# Patient Record
Sex: FEMALE | Race: WHITE | Employment: PART TIME | URBAN - METROPOLITAN AREA
[De-identification: names, ages, dates, MRNs, and addresses within clinical notes are randomized per-mention and may not be internally consistent; named-entity substitution may affect disease eponyms.]

---

## 2017-11-17 ENCOUNTER — OFFICE VISIT (OUTPATIENT)
Dept: ORTHOPEDIC SURGERY | Age: 52
End: 2017-11-17

## 2017-11-17 VITALS
DIASTOLIC BLOOD PRESSURE: 81 MMHG | SYSTOLIC BLOOD PRESSURE: 126 MMHG | HEART RATE: 88 BPM | HEIGHT: 64 IN | WEIGHT: 160 LBS | BODY MASS INDEX: 27.31 KG/M2

## 2017-11-17 DIAGNOSIS — M25.561 RIGHT KNEE PAIN, UNSPECIFIED CHRONICITY: Primary | ICD-10-CM

## 2017-11-17 DIAGNOSIS — Z98.890 STATUS POST KNEE SURGERY: ICD-10-CM

## 2017-11-17 DIAGNOSIS — M17.5 OTHER SECONDARY OSTEOARTHRITIS OF RIGHT KNEE: ICD-10-CM

## 2017-11-17 PROCEDURE — 4004F PT TOBACCO SCREEN RCVD TLK: CPT | Performed by: ORTHOPAEDIC SURGERY

## 2017-11-17 PROCEDURE — 99203 OFFICE O/P NEW LOW 30 MIN: CPT | Performed by: ORTHOPAEDIC SURGERY

## 2017-11-17 PROCEDURE — L1810 KO ELASTIC WITH JOINTS: HCPCS | Performed by: ORTHOPAEDIC SURGERY

## 2017-11-17 PROCEDURE — 3014F SCREEN MAMMO DOC REV: CPT | Performed by: ORTHOPAEDIC SURGERY

## 2017-11-17 PROCEDURE — 73564 X-RAY EXAM KNEE 4 OR MORE: CPT | Performed by: ORTHOPAEDIC SURGERY

## 2017-11-17 PROCEDURE — 3017F COLORECTAL CA SCREEN DOC REV: CPT | Performed by: ORTHOPAEDIC SURGERY

## 2017-11-17 PROCEDURE — G8419 CALC BMI OUT NRM PARAM NOF/U: HCPCS | Performed by: ORTHOPAEDIC SURGERY

## 2017-11-17 PROCEDURE — G8484 FLU IMMUNIZE NO ADMIN: HCPCS | Performed by: ORTHOPAEDIC SURGERY

## 2017-11-17 PROCEDURE — G8427 DOCREV CUR MEDS BY ELIG CLIN: HCPCS | Performed by: ORTHOPAEDIC SURGERY

## 2017-11-17 NOTE — PROGRESS NOTES
History     Social History    Marital status:      Spouse name: N/A    Number of children: N/A    Years of education: N/A     Occupational History    Not on file. Social History Main Topics    Smoking status: Current Every Day Smoker    Smokeless tobacco: Current User    Alcohol use Not on file    Drug use: Unknown    Sexual activity: Not on file     Other Topics Concern    Not on file     Social History Narrative    No narrative on file     No family history on file. Current Medications:    Current Outpatient Prescriptions   Medication Sig Dispense Refill    mesalamine (LIALDA) 1.2 G EC tablet Take 2,400 mg by mouth 2 times daily.  valACYclovir (VALTREX) 1 G tablet Take 1,000 mg by mouth daily. No current facility-administered medications for this visit. Allergies: Allergies   Allergen Reactions    Banana Itching    Codeine Nausea Only    Iv Dye [Iodides] Itching       Physical Exam:  Vitals:    11/17/17 0914   BP: 126/81   Pulse: 88     General: Ye Owen is a 46y.o. year old female who is sitting comfortably in our office in no acute distress. Alert and oriented. Objective:   Physical Exam  Vital Signs:  /81   Pulse 88   Ht 5' 4\" (1.626 m)   Wt 160 lb (72.6 kg)   BMI 27.46 kg/m²     Constitution:  Generally, Ye Owen is [x] alert, [x] appears stated age, and [x] in no distress.   Her general body habitus is [] Cachectic  [] Thin  [x] Normal  [] Obese  [] Morbidly Obese    Head: [x] Normocephalic  Eyes: [x] Extra-occular muscles intact  Left Ear: [x] External Ear normal   Right Ear: [x] External Ear normal   Nose: [x] Normal  Mouth: [x] Oral mucosa moist  [x] No perioral lesions    Pulmonary: [x] Respirations unlabored and regular  Skin: [x] Warm [x] Well perfused     Psychiatric:   [x] Good judgement and insight  [x] Oriented to [x] person, [x] place, and [x] time  [x] Mood appropriate for circumstances    Gait:   Gait is [] Normal

## 2017-11-30 ENCOUNTER — OFFICE VISIT (OUTPATIENT)
Dept: ORTHOPEDIC SURGERY | Age: 52
End: 2017-11-30

## 2017-11-30 VITALS
HEART RATE: 70 BPM | HEIGHT: 64 IN | WEIGHT: 160.05 LBS | BODY MASS INDEX: 27.33 KG/M2 | SYSTOLIC BLOOD PRESSURE: 124 MMHG | DIASTOLIC BLOOD PRESSURE: 78 MMHG

## 2017-11-30 DIAGNOSIS — S83.241D TEAR OF MEDIAL MENISCUS OF RIGHT KNEE, CURRENT, UNSPECIFIED TEAR TYPE, SUBSEQUENT ENCOUNTER: Primary | ICD-10-CM

## 2017-11-30 PROCEDURE — 3017F COLORECTAL CA SCREEN DOC REV: CPT | Performed by: ORTHOPAEDIC SURGERY

## 2017-11-30 PROCEDURE — 3014F SCREEN MAMMO DOC REV: CPT | Performed by: ORTHOPAEDIC SURGERY

## 2017-11-30 PROCEDURE — G8419 CALC BMI OUT NRM PARAM NOF/U: HCPCS | Performed by: ORTHOPAEDIC SURGERY

## 2017-11-30 PROCEDURE — 4004F PT TOBACCO SCREEN RCVD TLK: CPT | Performed by: ORTHOPAEDIC SURGERY

## 2017-11-30 PROCEDURE — G8484 FLU IMMUNIZE NO ADMIN: HCPCS | Performed by: ORTHOPAEDIC SURGERY

## 2017-11-30 PROCEDURE — 99213 OFFICE O/P EST LOW 20 MIN: CPT | Performed by: ORTHOPAEDIC SURGERY

## 2017-11-30 PROCEDURE — G8427 DOCREV CUR MEDS BY ELIG CLIN: HCPCS | Performed by: ORTHOPAEDIC SURGERY

## 2017-11-30 NOTE — PROGRESS NOTES
(VALTREX) 1 G tablet Take 1,000 mg by mouth daily. No current facility-administered medications on file prior to visit. Allergies   Allergen Reactions    Banana Itching    Codeine Nausea Only    Iv Dye [Iodides] Itching       Review of Systems:  A 14 point review of systems available in the scanned medical record as documented by the patient. The review is negative with the exception of those things mentioned in the HPI and Past Medical History. Vital Signs:  Vitals:    11/30/17 1124   BP: 124/78   Pulse: 70         General Exam:     Neuro: Alert & oriented x 3,  normal,  no focal deficits noted. Normal affect. Eyes: sclera clear  Ears: Normal external ear  Lymph: no lymphedema  Pulm: Respirations unlabored and regular  Pulse: Regular rate and rhythm   Skin: Warm, well perfused    Musculoskeletal:    Right Knee Exam:   Overall alignment is neutral     Gait/Alignment: No use of assistive devices.      Inspection/Skin: Skin is intact without erythema or ecchymosis. No significant swelling. No deformity.      Effusion; none.      Palpation: The patient is nontender over the medial lateral joint lines but does have a pierced to be radiating pain along the anterior portion of her of her knee joint. Patient has minimal crepitus with passive range of motion of the knee within the patellofemoral joint. Calf compartments are soft and non-tender. No signs of DVT.      Range of Motion: From 0° to 120° degrees of flexion without pain.     Strength: 5/5 strength of the quadriceps and hamstrings.      Ligamentous Stability: Stable to valgus and varus stress testing at 0° and 30°. Lachman's has a solid endpoint. No posterior sag. Negative posterior drawer.     Neurologic and vascular: Normal motor and sensory function in the deep peroneal, superficial peroneal, tibial, saphenous, and sural nerve distributions.  Foot warm and well perfused            Radiology:     3-view X-rays of the right knee including AP, lateral and Merchant views were obtained and reviewed in office today: X-rays demonstrate plain radiographs of the patient's right knee shows arthritic changes within all 3 compartments of the knee joint. There is no signs of loose body or fracture. Impression: Right knee likely meniscus tear    Visit Diagnoses:  1. Tear of medial meniscus of right knee, current, unspecified tear type, subsequent encounter         Office Procedures:  No orders of the defined types were placed in this encounter. Treatment Plan: Diagnoses and treatments were reviewed with the patient today. Patient education material was provided. Questions were entertained and answered to the patient's satisfaction. Given that the patient has exhausted all forms of conservative management and her history as well as physical exam we also believe that she has a meniscus injury, we believe that he would be in the patient's best interest to obtain an MRI of her right knee in order to ascertain whether she actually truly has a meniscal injury and if so this would be used to guide us in surgical management of the patient's injury. Greater than 50% of the appointment was used to  the patient on her diagnosis as well as various treatment options based off of what her true diagnosis is confirmed to be on MRI. All questions were answered in great detail and the patient stated a good understanding of everything that was explained. Once the patient has obtained her MRI we will have him follow-up in our clinic so that we can discuss the results and plan for treatment. Sincerely,    Juan Yanez MD  Clinical Fellow in [address] [place] [address]    I attest that my visit today with Christine Hills was supervised by Dr Shara Kunz    This dictation was performed with a verbal recognition program [facility]) and it was checked for errors.   It is possible that there are still dictated errors

## 2017-12-22 ENCOUNTER — TELEPHONE (OUTPATIENT)
Dept: ORTHOPEDIC SURGERY | Age: 52
End: 2017-12-22

## 2017-12-22 DIAGNOSIS — S83.241A TEAR OF MEDIAL MENISCUS OF RIGHT KNEE, UNSPECIFIED TEAR TYPE, UNSPECIFIED WHETHER OLD OR CURRENT TEAR, INITIAL ENCOUNTER: Primary | ICD-10-CM

## 2018-01-23 ENCOUNTER — HOSPITAL ENCOUNTER (OUTPATIENT)
Dept: PHYSICAL THERAPY | Age: 53
Discharge: HOME | End: 2018-01-31
Admitting: ORTHOPAEDIC SURGERY

## 2018-01-23 NOTE — PLAN OF CARE
[facility]  Orthopaedics and Sports Rehabilitation, [facility]  [address], [place], [address]  Phone: [phone]   Fax:     [phone]                                                       Physical Therapy Certification    Dear Referring Practitioner: Dr. Arianna Hsu,    We had the pleasure of evaluating the following patient for physical therapy services at [address] [place] [address]. A summary of our findings can be found in the initial assessment below. This includes our plan of care. If you have any questions or concerns regarding these findings, please do not hesitate to contact me at the office phone number checked above. Thank you for the referral.       Physician Signature:_______________________________Date:__________________  By signing above (or electronic signature), therapists plan is approved by physician      Patient: Bridget Velasco   : 1965   MRN: [id number]  Referring Physician: Referring Practitioner: Dr. Arianna Hsu      Evaluation Date: 2018      Medical Diagnosis Information:  Diagnosis: Q02.437R (ICD-10-CM) - Tear of medial meniscus of right knee, unspecified tear type, unspecified whether old or current tear, initial encounter   Treatment Diagnosis: Right Knee Pain                                         Insurance information: PT Insurance Information: [employer]     Precautions/ Contra-indications:   Latex Allergy:  [x]NO      []YES=  Preferred Language for Healthcare:   [x]English       []other:    SUBJECTIVE: Patient stated complaint:Patient states 3-4 months ago had an insidious onset of pain. Patient states  patient states right knee reconstruction. Patient states until recently knee has been doing well. 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   []Signs/symptoms consistent with functional hip weakness/NMR control      []Signs/symptoms consistent with tendinitis/tendinosis    []signs/symptoms consistent with pathology which may benefit from Dry needling      []other:      Prognosis/Rehab Potential:      []Excellent   [x]Good    []Fair   []Poor    Tolerance of evaluation/treatment:    []Excellent   [x]Good    []Fair   []Poor    Physical Therapy Evaluation Complexity Justification  [x] A history of present problem with:  [] no personal factors and/or comorbidities that impact the plan of care;  [x]1-2 personal factors and/or comorbidities that impact the plan of care  []3 personal factors and/or comorbidities that impact the plan of care  [x] An examination of body systems using standardized tests and measures addressing any of the following: body structures and functions (impairments), activity limitations, and/or participation restrictions;:  [x] a total of 1-2 or more elements   [] a total of 3 or more elements   [] a total of 4 or more elements   [x] A clinical presentation with:  [x] stable and/or uncomplicated characteristics   [] evolving clinical presentation with changing characteristics  [] unstable and unpredictable characteristics;   [x] Clinical decision making of [x] low, [] moderate, [] high complexity using standardized patient assessment instrument and/or measurable assessment of functional outcome. [x] EVAL (LOW) 41250 (typically 20 minutes face-to-face)  [] EVAL (MOD) 88362 (typically 30 minutes face-to-face)  [] EVAL (HIGH) 49890 (typically 45 minutes face-to-face)  [] RE-EVAL     PLAN  Frequency/Duration:  2 days per week for 4 Weeks:  Interventions:  1. Therapeutic exercise including: strength training, ROM/flexibility, NMR and proprioception for the proximal hip, core and Lower extremity  2. Manual therapy as indicated including Dry Needling/IASTM, STM, PROM, Gr I-IV mobilizations, spinal mobilization/manipulation.    3. Modalities as needed including: thermal agents, E-stim, US, iontophoresis as indicated. 4. Patient education on joint protection, activity modification, progression of HEP. HEP instruction: Can be found scanned in media file. (see scanned forms)    GOALS:  Patient stated goal: Work out and perform ADLs    The patient will demonstrate at least 20% but less than 40% impairment, limitation or restriction in:   - walking and moving around (mobility)       Therapist goals for Patient:   Short Term Goals: To be achieved in: 2 weeks  1. Independent in HEP and progression per patient tolerance, in order to prevent re-injury. 2. Patient will have a decrease in pain to facilitate improvement in movement, function, and ADLs as indicated by Functional Deficits. Long Term Goals: To be achieved in: 4 weeks  1. Disability index score of 20% or less for the LEFS to assist with reaching prior level of function. 2. Patient will demonstrate increased AROM to 5 deg ext to allow for proper joint functioning as indicated by patients Functional Deficits. 3. Patient will demonstrate an increase in Strength to 5/5 right knee and hip to allow for proper functional mobility as indicated by patients Functional Deficits. 4. Patient will return to go up and down a flight of stairs without increased symptoms or restriction. 5. Patient will be able to arise from a chair without increased symptoms or restriction.       Electronically signed by:  Adry Anton, Toribio Anton 1

## 2018-01-23 NOTE — FLOWSHEET NOTE
Flex x15    SLR ABD x15    SLR Ext     Clamshells     Prone Donkey Kick     Bridge     Ankle Theraband     BAPS     HR/TR     Squats     Lateral Walk     Single Leg Balance     EOT Hip Ext/ Donkey Kick                  Manual      Hip Mobs with Belt     Knee Mobs x4'    Patellar Mobs x4'    Ankle Mobs/PROM Grade-         Therapeutic Exercise and NMR EXR  [] (51993) Provided verbal/tactile cueing for activities related to strengthening, flexibility, endurance, ROM for improvements in LE, proximal hip, and core control with self care, mobility, lifting, ambulation.  [] (91154) Provided verbal/tactile cueing for activities related to improving balance, coordination, kinesthetic sense, posture, motor skill, proprioception  to assist with LE, proximal hip, and core control in self care, mobility, lifting, ambulation and eccentric single leg control.      NMR and Therapeutic Activities:    [] (15437 or 30857) Provided verbal/tactile cueing for activities related to improving balance, coordination, kinesthetic sense, posture, motor skill, proprioception and motor activation to allow for proper function of core, proximal hip and LE with self care and ADLs  [] (45297) Gait Re-education- Provided training and instruction to the patient for proper LE, core and proximal hip recruitment and positioning and eccentric body weight control with ambulation re-education including up and down stairs     Home Exercise Program:    [x] (05938) Reviewed/Progressed HEP activities related to strengthening, flexibility, endurance, ROM of core, proximal hip and LE for functional self-care, mobility, lifting and ambulation/stair navigation   [] (34103)Reviewed/Progressed HEP activities related to improving balance, coordination, kinesthetic sense, posture, motor skill, proprioception of core, proximal hip and LE for self care, mobility, lifting, and ambulation/stair navigation      Manual Treatments:  PROM / STM / Oscillations-Mobs:  G-I, II,

## 2018-01-29 ENCOUNTER — HOSPITAL ENCOUNTER (OUTPATIENT)
Dept: PHYSICAL THERAPY | Age: 53
Discharge: HOME | End: 2018-01-30
Admitting: ORTHOPAEDIC SURGERY

## 2018-01-29 NOTE — FLOWSHEET NOTE
Tolerance:  [x] Patient tolerated treatment well [] Patient limited by fatique  [] Patient limited by pain  [] Patient limited by other medical complications  [] Other:     Prognosis: [x] Good [] Fair  [] Poor    Patient Requires Follow-up: [x] Yes  [] No    PLAN: See eval  [x] Continue per plan of care [] Alter current plan (see comments)  [] Plan of care initiated [] Hold pending MD visit [] Discharge    *If patient does not return for further follow ups after this date. Please consider this as the patients discharge from physical therapy.      Electronically signed by: Caroline Angel, [id number] [facility]

## 2018-01-31 ENCOUNTER — HOSPITAL ENCOUNTER (OUTPATIENT)
Dept: PHYSICAL THERAPY | Age: 53
Discharge: HOME | End: 2018-02-01
Admitting: ORTHOPAEDIC SURGERY

## 2018-02-01 ENCOUNTER — HOSPITAL ENCOUNTER (OUTPATIENT)
Dept: PHYSICAL THERAPY | Age: 53
Discharge: HOME | End: 2018-02-28
Attending: ORTHOPAEDIC SURGERY | Admitting: ORTHOPAEDIC SURGERY

## 2018-02-07 ENCOUNTER — HOSPITAL ENCOUNTER (OUTPATIENT)
Dept: PHYSICAL THERAPY | Age: 53
Discharge: HOME | End: 2018-02-08
Admitting: ORTHOPAEDIC SURGERY

## 2018-02-07 NOTE — FLOWSHEET NOTE
improving soft tissue extensibility and allowing for proper ROM for normal function with self care, mobility, lifting and ambulation. Modalities:   pt. Declined     Charges:  Timed Code Treatment Minutes: 40   Total Treatment Minutes: 40   Pt. Was in a hour time slot  [] EVAL (LOW) 04534 (typically 20 minutes face-to-face)  [] EVAL (MOD) 07466 (typically 30 minutes face-to-face)  [] EVAL (HIGH) 14507 (typically 45 minutes face-to-face)  [] RE-EVAL      [x] LR(08139) x  2   [] IONTO  [x] NMR (43312) x  1   [] VASO  [] Manual (54821) x       [] Other:  [] TA x       [] Mech Traction (02243)  [] ES(attended) (39436)      [] ES (un) (15344):     GOALS:   Short Term Goals: To be achieved in: 2 weeks  1. Independent in HEP and progression per patient tolerance, in order to prevent re-injury. 2. Patient will have a decrease in pain to facilitate improvement in movement, function, and ADLs as indicated by Functional Deficits. Long Term Goals: To be achieved in: 4 weeks  1. Disability index score of 20% or less for the LEFS to assist with reaching prior level of function. 2. Patient will demonstrate increased AROM to 5 deg ext to allow for proper joint functioning as indicated by patients Functional Deficits. 3. Patient will demonstrate an increase in Strength to 5/5 right knee and hip to allow for proper functional mobility as indicated by patients Functional Deficits. 4. Patient will return to go up and down a flight of stairs without increased symptoms or restriction. 5. Patient will be able to arise from a chair without increased symptoms or restriction. Progression Towards Functional goals:  [] Patient is progressing as expected towards functional goals listed. [] Progression is slowed due to complexities listed. [] Progression has been slowed due to co-morbidities.   [x] Plan just implemented, too soon to assess goals progression  [] Other:     ASSESSMENT: knee ext ROM is improving with PT.    Treatment/Activity Tolerance:  [x] Patient tolerated treatment well [] Patient limited by fatique  [] Patient limited by pain  [] Patient limited by other medical complications  [] Other:     Prognosis: [x] Good [] Fair  [] Poor    Patient Requires Follow-up: [x] Yes  [] No    PLAN: See eval  [x] Continue per plan of care [] Alter current plan (see comments)  [] Plan of care initiated [] Hold pending MD visit [] Discharge    *If patient does not return for further follow ups after this date. Please consider this as the patients discharge from physical therapy.      Electronically signed by: Shakir Kelly, [address], Toribio Grady 1

## 2018-02-14 ENCOUNTER — HOSPITAL ENCOUNTER (OUTPATIENT)
Dept: PHYSICAL THERAPY | Age: 53
Discharge: HOME | End: 2018-02-15
Admitting: ORTHOPAEDIC SURGERY

## 2018-02-14 NOTE — FLOWSHEET NOTE
[facility]    Physical Therapy Daily Treatment Note  Date:  2018    Patient Name:  Ivan Martel    :  1965  MRN: [id number]  Restrictions/Precautions:    Medical/Treatment Diagnosis Information:  · Diagnosis: G00.435P (ICD-10-CM) - Tear of medial meniscus of right knee, unspecified tear type, unspecified whether old or current tear, initial encounter  · Treatment Diagnosis: Right Knee Pain  Insurance/Certification information:  PT Insurance Information: [id number]  Physician Information:  Referring Practitioner: Dr. Brielle Banda of care signed (Y/N):     Date of Patient follow up with Physician:     G-Code (if applicable):      Date G-Code Applied:    PT G-Codes  Functional Assessment Tool Used: LEFS  Score: 40%  Functional Limitation: Mobility: Walking and moving around  Mobility: Walking and Moving Around Goal Status (): At least 20 percent but less than 40 percent impaired, limited or restricted  Mobility: Walking and Moving Around Discharge Status ():  At least 20 percent but less than 40 percent impaired, limited or restricted    Progress Note: [x]  Yes  []  No  Next due by: Visit #10       Latex Allergy:  [x]NO      []YES  Preferred Language for Healthcare:   [x]English       []other:    Visit # Insurance Allowable   5 30     Pain level:  /10     SUBJECTIVE:  See D/C note      OBJECTIVE: See eval  Observation:   Test measurements:      RESTRICTIONS/PRECAUTIONS: OA, Right Knee Reconstruction , Cancer-No EGS    Exercises/Interventions:             Script-18  Stretching Reps Notes/Last Progression   Bike  5'    Airdyne     Eliptical     Gastroc Stretch  3x30\" slant   Hamstring Stretch: Tableside 3x30\" manual   Piriformis Cross Over 3x30\"    Figure 4 Piriformis     Supine ITB      SKC     DKC     Adductor Stretch     Heel Prop/ Prone Hang     Wallslide     SKTC with SB     BKFO                   Exercises     Add Iso SAQ     SLR Flex 2x15    SLR ABD 2x15    SLR Ext     Clamshells - SL [place] loop 3x10 B    Prone Donkey Kick     Bridge 3x10 Ball squeeze   Ankle Theraband     BAPS     HR X 30    Squats     Lateral Walk     Single Leg Balance     TKE on [facility] 60# 30x    PEP 2'    FSU 6' x 15    ATC area                  Manual      Hip Mobs with Belt           Ankle Mobs/PROM Grade-         Therapeutic Exercise and NMR EXR  [x] (45351) Provided verbal/tactile cueing for activities related to strengthening, flexibility, endurance, ROM for improvements in LE, proximal hip, and core control with self care, mobility, lifting, ambulation. [x] (87338) Provided verbal/tactile cueing for activities related to improving balance, coordination, kinesthetic sense, posture, motor skill, proprioception  to assist with LE, proximal hip, and core control in self care, mobility, lifting, ambulation and eccentric single leg control.      NMR and Therapeutic Activities:    [] (78217 or 36915) Provided verbal/tactile cueing for activities related to improving balance, coordination, kinesthetic sense, posture, motor skill, proprioception and motor activation to allow for proper function of core, proximal hip and LE with self care and ADLs  [] (29232) Gait Re-education- Provided training and instruction to the patient for proper LE, core and proximal hip recruitment and positioning and eccentric body weight control with ambulation re-education including up and down stairs     Home Exercise Program:    [x] (09330) Reviewed/Progressed HEP activities related to strengthening, flexibility, endurance, ROM of core, proximal hip and LE for functional self-care, mobility, lifting and ambulation/stair navigation   [] (79529)Reviewed/Progressed HEP activities related to improving balance, coordination, kinesthetic sense, posture, motor skill, proprioception of core, proximal hip and LE for self care, mobility, lifting, and ambulation/stair navigation      Manual Functional goals:  [] Patient is progressing as expected towards functional goals listed. [] Progression is slowed due to complexities listed. [] Progression has been slowed due to co-morbidities. [x] Plan just implemented, too soon to assess goals progression  [] Other:     ASSESSMENT: knee ext ROM is improving with PT. Treatment/Activity Tolerance:  [x] Patient tolerated treatment well [] Patient limited by fatique  [] Patient limited by pain  [] Patient limited by other medical complications  [] Other:     Prognosis: [x] Good [] Fair  [] Poor    Patient Requires Follow-up: [x] Yes  [] No    PLAN: See eval  [x] Continue per plan of care [] Alter current plan (see comments)  [] Plan of care initiated [] Hold pending MD visit [] Discharge    *If patient does not return for further follow ups after this date. Please consider this as the patients discharge from physical therapy.      Electronically signed by: Hue Snow, [address], Toribio Patel 1

## 2018-03-01 ENCOUNTER — HOSPITAL ENCOUNTER (OUTPATIENT)
Dept: PHYSICAL THERAPY | Age: 53
Discharge: HOME | End: 2018-03-01
Attending: ORTHOPAEDIC SURGERY | Admitting: ORTHOPAEDIC SURGERY

## 2018-03-06 ENCOUNTER — OFFICE VISIT (OUTPATIENT)
Dept: ORTHOPEDIC SURGERY | Age: 53
End: 2018-03-06

## 2018-03-06 VITALS
HEIGHT: 64 IN | HEART RATE: 76 BPM | SYSTOLIC BLOOD PRESSURE: 126 MMHG | DIASTOLIC BLOOD PRESSURE: 72 MMHG | BODY MASS INDEX: 27.81 KG/M2 | WEIGHT: 162.92 LBS

## 2018-03-06 DIAGNOSIS — S83.241D TEAR OF MEDIAL MENISCUS OF RIGHT KNEE, CURRENT, UNSPECIFIED TEAR TYPE, SUBSEQUENT ENCOUNTER: Primary | ICD-10-CM

## 2018-03-06 PROCEDURE — G8419 CALC BMI OUT NRM PARAM NOF/U: HCPCS | Performed by: ORTHOPAEDIC SURGERY

## 2018-03-06 PROCEDURE — G8427 DOCREV CUR MEDS BY ELIG CLIN: HCPCS | Performed by: ORTHOPAEDIC SURGERY

## 2018-03-06 PROCEDURE — 3017F COLORECTAL CA SCREEN DOC REV: CPT | Performed by: ORTHOPAEDIC SURGERY

## 2018-03-06 PROCEDURE — 99213 OFFICE O/P EST LOW 20 MIN: CPT | Performed by: ORTHOPAEDIC SURGERY

## 2018-03-06 PROCEDURE — G8484 FLU IMMUNIZE NO ADMIN: HCPCS | Performed by: ORTHOPAEDIC SURGERY

## 2018-03-06 PROCEDURE — 4004F PT TOBACCO SCREEN RCVD TLK: CPT | Performed by: ORTHOPAEDIC SURGERY

## 2018-03-06 PROCEDURE — 3014F SCREEN MAMMO DOC REV: CPT | Performed by: ORTHOPAEDIC SURGERY

## 2018-03-06 NOTE — PROGRESS NOTES
Chief Complaint    Knee Pain (RIght knee )      History of Present Illness:  Alton Novak is a 46 y.o. female who presents for follow up of her right knee. Patient has a history of undergoing an ACL reconstruction with meniscus repair nearly 32 years ago by Dr. Laura Cruz. Over the last 2 months she is been experiencing persistent medial sided right knee pain along with feelings of some instability and catching. We did get her started on physical therapy where she was able to do over the last 4-6 weeks. She reports that it meant and strengthening her right lower extremity however she continues to have persistent symptoms over the medial side of the right knee along with the catching symptoms. She reports that when she is standing and shifting from side to side her symptoms become worse. Today she rates her pain as 4/10 VAS. She does describe an achy and sharp pain. Denies any new injuries. Past Medical History:   Diagnosis Date    Arthritis         Past Surgical History:   Procedure Laterality Date    AORTO-ILIAC BYPASS GRAFT      CERVIX REMOVAL      TOTAL NEPHRECTOMY Right        History reviewed. No pertinent family history. Social History     Social History    Marital status:      Spouse name: N/A    Number of children: N/A    Years of education: N/A     Social History Main Topics    Smoking status: Current Every Day Smoker     Packs/day: 0.50    Smokeless tobacco: Current User    Alcohol use 9.0 oz/week     15 Glasses of wine per week    Drug use: Yes     Frequency: 1.0 time per week     Types: Marijuana    Sexual activity: Not Asked     Other Topics Concern    None     Social History Narrative    None       Current Outpatient Prescriptions   Medication Sig Dispense Refill    Pantoprazole Sodium (PROTONIX PO) Take by mouth      mesalamine (LIALDA) 1.2 G EC tablet Take 2,400 mg by mouth 2 times daily.       valACYclovir (VALTREX) 1 G tablet Take 1,000 mg by mouth

## 2018-03-22 ENCOUNTER — HOSPITAL ENCOUNTER (OUTPATIENT)
Dept: NON INVASIVE DIAGNOSTICS | Age: 53
Discharge: HOME | End: 2018-03-22
Attending: INTERNAL MEDICINE | Admitting: INTERNAL MEDICINE

## 2018-03-22 DIAGNOSIS — Z00.00 ENCOUNTER FOR GENERAL ADULT MEDICAL EXAMINATION WITHOUT ABNORMAL FINDINGS: ICD-10-CM

## 2018-03-27 DIAGNOSIS — S83.241A TEAR OF MEDIAL MENISCUS OF RIGHT KNEE, UNSPECIFIED TEAR TYPE, UNSPECIFIED WHETHER OLD OR CURRENT TEAR, INITIAL ENCOUNTER: Primary | ICD-10-CM

## 2018-04-05 ENCOUNTER — OFFICE VISIT (OUTPATIENT)
Dept: ORTHOPEDIC SURGERY | Age: 53
End: 2018-04-05

## 2018-04-05 VITALS
SYSTOLIC BLOOD PRESSURE: 139 MMHG | DIASTOLIC BLOOD PRESSURE: 92 MMHG | WEIGHT: 162.92 LBS | BODY MASS INDEX: 27.81 KG/M2 | HEIGHT: 64 IN | HEART RATE: 71 BPM

## 2018-04-05 DIAGNOSIS — M25.561 RIGHT KNEE PAIN, UNSPECIFIED CHRONICITY: ICD-10-CM

## 2018-04-05 DIAGNOSIS — M23.203 OLD COMPLEX TEAR OF MEDIAL MENISCUS OF RIGHT KNEE: Primary | ICD-10-CM

## 2018-04-05 PROCEDURE — G8419 CALC BMI OUT NRM PARAM NOF/U: HCPCS | Performed by: ORTHOPAEDIC SURGERY

## 2018-04-05 PROCEDURE — 3017F COLORECTAL CA SCREEN DOC REV: CPT | Performed by: ORTHOPAEDIC SURGERY

## 2018-04-05 PROCEDURE — 3014F SCREEN MAMMO DOC REV: CPT | Performed by: ORTHOPAEDIC SURGERY

## 2018-04-05 PROCEDURE — G8427 DOCREV CUR MEDS BY ELIG CLIN: HCPCS | Performed by: ORTHOPAEDIC SURGERY

## 2018-04-05 PROCEDURE — 4004F PT TOBACCO SCREEN RCVD TLK: CPT | Performed by: ORTHOPAEDIC SURGERY

## 2018-04-05 PROCEDURE — 99213 OFFICE O/P EST LOW 20 MIN: CPT | Performed by: ORTHOPAEDIC SURGERY

## 2018-04-09 ENCOUNTER — TELEPHONE (OUTPATIENT)
Dept: ORTHOPEDIC SURGERY | Age: 53
End: 2018-04-09

## 2018-04-09 DIAGNOSIS — M23.203 DERANGEMENT OF MEDIAL MENISCUS OF RIGHT KNEE DUE TO OLD INJURY: Primary | ICD-10-CM

## 2018-04-17 ENCOUNTER — TELEPHONE (OUTPATIENT)
Dept: ORTHOPEDIC SURGERY | Age: 53
End: 2018-04-17

## 2018-04-24 ENCOUNTER — HOSPITAL ENCOUNTER (OUTPATIENT)
Dept: PREADMISSION TESTING | Age: 53
Discharge: HOME | End: 2018-04-25
Attending: ORTHOPAEDIC SURGERY | Admitting: ORTHOPAEDIC SURGERY

## 2018-04-24 ENCOUNTER — OFFICE VISIT (OUTPATIENT)
Dept: ORTHOPEDIC SURGERY | Age: 53
End: 2018-04-24

## 2018-04-24 VITALS — WEIGHT: 162 LBS | HEIGHT: 64 IN | BODY MASS INDEX: 27.66 KG/M2

## 2018-04-24 VITALS
DIASTOLIC BLOOD PRESSURE: 72 MMHG | BODY MASS INDEX: 27.66 KG/M2 | WEIGHT: 162 LBS | HEIGHT: 64 IN | SYSTOLIC BLOOD PRESSURE: 109 MMHG | HEART RATE: 74 BPM

## 2018-04-24 DIAGNOSIS — M23.203 OLD COMPLEX TEAR OF MEDIAL MENISCUS OF RIGHT KNEE: Primary | ICD-10-CM

## 2018-04-24 PROCEDURE — MISC250 COMPRESSION STOCKING: Performed by: ORTHOPAEDIC SURGERY

## 2018-04-24 PROCEDURE — PREOPEXAM PRE-OP EXAM: Performed by: ORTHOPAEDIC SURGERY

## 2018-04-24 PROCEDURE — E0114 CRUTCH UNDERARM PAIR NO WOOD: HCPCS | Performed by: ORTHOPAEDIC SURGERY

## 2018-04-24 RX ORDER — OXYCODONE HYDROCHLORIDE 5 MG/1
5 TABLET ORAL EVERY 6 HOURS PRN
Qty: 12 TABLET | Refills: 0 | Status: SHIPPED | OUTPATIENT
Start: 2018-04-24 | End: 2018-04-27

## 2018-04-25 ENCOUNTER — HOSPITAL ENCOUNTER (OUTPATIENT)
Dept: SURGERY | Age: 53
Discharge: HOME | End: 2018-04-25
Admitting: ORTHOPAEDIC SURGERY

## 2018-04-25 VITALS
RESPIRATION RATE: 16 BRPM | BODY MASS INDEX: 27.66 KG/M2 | SYSTOLIC BLOOD PRESSURE: 146 MMHG | OXYGEN SATURATION: 96 % | DIASTOLIC BLOOD PRESSURE: 82 MMHG | WEIGHT: 162 LBS | HEART RATE: 62 BPM | TEMPERATURE: 98.6 F | HEIGHT: 64 IN

## 2018-04-25 LAB — PREGNANCY, URINE: NEGATIVE

## 2018-04-25 RX ORDER — SODIUM CHLORIDE 0.9 % (FLUSH) 0.9 %
10 SYRINGE (ML) INJECTION PRN
Status: DISCONTINUED | OUTPATIENT
Start: 2018-04-25 | End: 2018-04-26 | Stop reason: HOSPADM

## 2018-04-25 RX ORDER — HYDRALAZINE HYDROCHLORIDE 20 MG/ML
5 INJECTION INTRAMUSCULAR; INTRAVENOUS EVERY 10 MIN PRN
Status: DISCONTINUED | OUTPATIENT
Start: 2018-04-25 | End: 2018-04-26 | Stop reason: HOSPADM

## 2018-04-25 RX ORDER — HYDROMORPHONE HCL 110MG/55ML
PATIENT CONTROLLED ANALGESIA SYRINGE INTRAVENOUS
Status: DISPENSED
Start: 2018-04-25 | End: 2018-04-25

## 2018-04-25 RX ORDER — DOCUSATE SODIUM 100 MG/1
100 CAPSULE, LIQUID FILLED ORAL 2 TIMES DAILY
Qty: 60 CAPSULE | Refills: 1 | Status: SHIPPED | OUTPATIENT
Start: 2018-04-25

## 2018-04-25 RX ORDER — LABETALOL HYDROCHLORIDE 5 MG/ML
5 INJECTION, SOLUTION INTRAVENOUS EVERY 10 MIN PRN
Status: DISCONTINUED | OUTPATIENT
Start: 2018-04-25 | End: 2018-04-26 | Stop reason: HOSPADM

## 2018-04-25 RX ORDER — HYDROMORPHONE HCL 110MG/55ML
0.5 PATIENT CONTROLLED ANALGESIA SYRINGE INTRAVENOUS EVERY 5 MIN PRN
Status: DISCONTINUED | OUTPATIENT
Start: 2018-04-25 | End: 2018-04-26 | Stop reason: HOSPADM

## 2018-04-25 RX ORDER — SODIUM CHLORIDE, SODIUM LACTATE, POTASSIUM CHLORIDE, CALCIUM CHLORIDE 600; 310; 30; 20 MG/100ML; MG/100ML; MG/100ML; MG/100ML
INJECTION, SOLUTION INTRAVENOUS CONTINUOUS
Status: DISCONTINUED | OUTPATIENT
Start: 2018-04-25 | End: 2018-04-26 | Stop reason: HOSPADM

## 2018-04-25 RX ORDER — LIDOCAINE HYDROCHLORIDE 10 MG/ML
1 INJECTION, SOLUTION EPIDURAL; INFILTRATION; INTRACAUDAL; PERINEURAL
Status: ACTIVE | OUTPATIENT
Start: 2018-04-25 | End: 2018-04-25

## 2018-04-25 RX ORDER — ATORVASTATIN CALCIUM 40 MG/1
40 TABLET, FILM COATED ORAL DAILY
COMMUNITY

## 2018-04-25 RX ORDER — HYDROMORPHONE HCL 110MG/55ML
0.25 PATIENT CONTROLLED ANALGESIA SYRINGE INTRAVENOUS EVERY 5 MIN PRN
Status: DISCONTINUED | OUTPATIENT
Start: 2018-04-25 | End: 2018-04-26 | Stop reason: HOSPADM

## 2018-04-25 RX ORDER — ONDANSETRON 2 MG/ML
4 INJECTION INTRAMUSCULAR; INTRAVENOUS
Status: COMPLETED | OUTPATIENT
Start: 2018-04-25 | End: 2018-04-25

## 2018-04-25 RX ORDER — SODIUM CHLORIDE 0.9 % (FLUSH) 0.9 %
10 SYRINGE (ML) INJECTION EVERY 12 HOURS SCHEDULED
Status: DISCONTINUED | OUTPATIENT
Start: 2018-04-25 | End: 2018-04-26 | Stop reason: HOSPADM

## 2018-04-25 RX ADMIN — Medication 0.25 MG: at 11:08

## 2018-04-25 RX ADMIN — SODIUM CHLORIDE, SODIUM LACTATE, POTASSIUM CHLORIDE, CALCIUM CHLORIDE: 600; 310; 30; 20 INJECTION, SOLUTION INTRAVENOUS at 07:35

## 2018-04-25 RX ADMIN — Medication 0.25 MG: at 10:26

## 2018-04-25 RX ADMIN — ONDANSETRON 4 MG: 2 INJECTION INTRAMUSCULAR; INTRAVENOUS at 10:24

## 2018-04-25 RX ADMIN — Medication 0.25 MG: at 10:41

## 2018-04-25 ASSESSMENT — PAIN - FUNCTIONAL ASSESSMENT: PAIN_FUNCTIONAL_ASSESSMENT: 0-10

## 2018-04-25 ASSESSMENT — PAIN SCALES - GENERAL
PAINLEVEL_OUTOF10: 6
PAINLEVEL_OUTOF10: 4
PAINLEVEL_OUTOF10: 6
PAINLEVEL_OUTOF10: 4
PAINLEVEL_OUTOF10: 5
PAINLEVEL_OUTOF10: 5

## 2018-04-25 ASSESSMENT — PAIN DESCRIPTION - DESCRIPTORS: DESCRIPTORS: ACHING

## 2018-04-26 ENCOUNTER — HOSPITAL ENCOUNTER (OUTPATIENT)
Dept: PHYSICAL THERAPY | Age: 53
Discharge: HOME | End: 2018-04-30
Admitting: ORTHOPAEDIC SURGERY

## 2018-05-01 ENCOUNTER — HOSPITAL ENCOUNTER (OUTPATIENT)
Dept: PHYSICAL THERAPY | Age: 53
Discharge: HOME | End: 2018-05-31
Attending: ORTHOPAEDIC SURGERY | Admitting: ORTHOPAEDIC SURGERY

## 2018-05-01 ENCOUNTER — HOSPITAL ENCOUNTER (OUTPATIENT)
Dept: PHYSICAL THERAPY | Age: 53
Discharge: HOME | End: 2018-05-02
Admitting: ORTHOPAEDIC SURGERY

## 2018-05-03 ENCOUNTER — HOSPITAL ENCOUNTER (OUTPATIENT)
Dept: PHYSICAL THERAPY | Age: 53
Discharge: HOME | End: 2018-05-04
Admitting: ORTHOPAEDIC SURGERY

## 2018-05-10 ENCOUNTER — HOSPITAL ENCOUNTER (OUTPATIENT)
Dept: PHYSICAL THERAPY | Age: 53
Discharge: HOME | End: 2018-05-11
Admitting: ORTHOPAEDIC SURGERY

## 2018-05-15 ENCOUNTER — OFFICE VISIT (OUTPATIENT)
Dept: ORTHOPEDIC SURGERY | Age: 53
End: 2018-05-15

## 2018-05-15 VITALS
WEIGHT: 162 LBS | HEIGHT: 64 IN | SYSTOLIC BLOOD PRESSURE: 121 MMHG | HEART RATE: 72 BPM | DIASTOLIC BLOOD PRESSURE: 75 MMHG | BODY MASS INDEX: 27.66 KG/M2

## 2018-05-15 DIAGNOSIS — Z98.890 S/P RIGHT KNEE ARTHROSCOPY: Primary | ICD-10-CM

## 2018-05-15 PROCEDURE — 99024 POSTOP FOLLOW-UP VISIT: CPT | Performed by: ORTHOPAEDIC SURGERY

## 2018-05-17 ENCOUNTER — HOSPITAL ENCOUNTER (OUTPATIENT)
Dept: PHYSICAL THERAPY | Age: 53
Discharge: HOME | End: 2018-05-18
Admitting: ORTHOPAEDIC SURGERY

## 2018-05-22 ENCOUNTER — HOSPITAL ENCOUNTER (OUTPATIENT)
Dept: PHYSICAL THERAPY | Age: 53
Discharge: HOME | End: 2018-05-23
Admitting: ORTHOPAEDIC SURGERY

## 2018-05-29 ENCOUNTER — HOSPITAL ENCOUNTER (OUTPATIENT)
Dept: PHYSICAL THERAPY | Age: 53
Discharge: HOME | End: 2018-05-30
Admitting: ORTHOPAEDIC SURGERY

## 2018-05-31 ENCOUNTER — HOSPITAL ENCOUNTER (OUTPATIENT)
Dept: PHYSICAL THERAPY | Age: 53
Discharge: HOME | End: 2018-06-30
Admitting: ORTHOPAEDIC SURGERY

## 2018-06-01 ENCOUNTER — HOSPITAL ENCOUNTER (OUTPATIENT)
Dept: PHYSICAL THERAPY | Age: 53
Discharge: HOME | End: 2018-06-01
Attending: ORTHOPAEDIC SURGERY | Admitting: ORTHOPAEDIC SURGERY

## 2018-06-12 ENCOUNTER — HOSPITAL ENCOUNTER (OUTPATIENT)
Dept: PHYSICAL THERAPY | Age: 53
Discharge: HOME | End: 2018-06-13
Admitting: ORTHOPAEDIC SURGERY

## 2018-07-01 ENCOUNTER — HOSPITAL ENCOUNTER (OUTPATIENT)
Dept: PHYSICAL THERAPY | Age: 53
Discharge: HOME | End: 2018-07-01
Attending: ORTHOPAEDIC SURGERY | Admitting: ORTHOPAEDIC SURGERY

## 2019-12-18 ENCOUNTER — APPOINTMENT (OUTPATIENT)
Dept: VASCULAR LAB | Age: 54
End: 2019-12-18
Payer: COMMERCIAL

## 2019-12-18 ENCOUNTER — HOSPITAL ENCOUNTER (EMERGENCY)
Age: 54
Discharge: HOME | End: 2019-12-18
Attending: EMERGENCY MEDICINE
Payer: COMMERCIAL

## 2019-12-18 VITALS
TEMPERATURE: 98.1 F | OXYGEN SATURATION: 98 % | WEIGHT: 127 LBS | HEART RATE: 68 BPM | SYSTOLIC BLOOD PRESSURE: 135 MMHG | DIASTOLIC BLOOD PRESSURE: 79 MMHG | BODY MASS INDEX: 21.68 KG/M2 | RESPIRATION RATE: 16 BRPM | HEIGHT: 64 IN

## 2019-12-18 DIAGNOSIS — M71.21 BAKER CYST, RIGHT: Primary | ICD-10-CM

## 2019-12-18 PROCEDURE — 93971 EXTREMITY STUDY: CPT

## 2019-12-18 PROCEDURE — 99283 EMERGENCY DEPT VISIT LOW MDM: CPT

## 2019-12-18 RX ORDER — IBUPROFEN 800 MG/1
800 TABLET ORAL EVERY 8 HOURS PRN
Qty: 30 TABLET | Refills: 0 | Status: SHIPPED | OUTPATIENT
Start: 2019-12-18

## 2019-12-18 ASSESSMENT — ENCOUNTER SYMPTOMS
BACK PAIN: 0
ABDOMINAL PAIN: 0
NAUSEA: 0
COUGH: 0
PHOTOPHOBIA: 0
EYE PAIN: 0
VOMITING: 0
DIARRHEA: 0
SHORTNESS OF BREATH: 0

## 2019-12-18 ASSESSMENT — PAIN SCALES - GENERAL: PAINLEVEL_OUTOF10: 4

## 2019-12-18 ASSESSMENT — PAIN DESCRIPTION - PAIN TYPE: TYPE: ACUTE PAIN

## 2019-12-18 ASSESSMENT — PAIN DESCRIPTION - DESCRIPTORS: DESCRIPTORS: ACHING;DULL

## 2019-12-18 ASSESSMENT — PAIN DESCRIPTION - ORIENTATION: ORIENTATION: RIGHT

## 2019-12-18 ASSESSMENT — PAIN DESCRIPTION - LOCATION: LOCATION: LEG

## 2020-04-08 ENCOUNTER — OFFICE VISIT (OUTPATIENT)
Dept: PRIMARY CARE CLINIC | Age: 55
End: 2020-04-08
Payer: COMMERCIAL

## 2020-04-08 VITALS — TEMPERATURE: 98.4 F | HEART RATE: 90 BPM | OXYGEN SATURATION: 97 %

## 2020-04-08 PROCEDURE — 99213 OFFICE O/P EST LOW 20 MIN: CPT | Performed by: NURSE PRACTITIONER

## 2020-04-08 PROCEDURE — 4004F PT TOBACCO SCREEN RCVD TLK: CPT | Performed by: NURSE PRACTITIONER

## 2020-04-08 PROCEDURE — G8420 CALC BMI NORM PARAMETERS: HCPCS | Performed by: NURSE PRACTITIONER

## 2020-04-08 PROCEDURE — G8428 CUR MEDS NOT DOCUMENT: HCPCS | Performed by: NURSE PRACTITIONER

## 2020-04-08 PROCEDURE — 3017F COLORECTAL CA SCREEN DOC REV: CPT | Performed by: NURSE PRACTITIONER

## 2020-04-08 NOTE — PROGRESS NOTES
posterior oropharyngeal erythema. Tonsils: No tonsillar abscesses. Cardiovascular:      Rate and Rhythm: Normal rate. Pulmonary:      Effort: Pulmonary effort is normal.   Lymphadenopathy:      Head:      Right side of head: No submental, submandibular, tonsillar, preauricular, posterior auricular or occipital adenopathy. Left side of head: No submental, submandibular, tonsillar, preauricular, posterior auricular or occipital adenopathy. Cervical: No cervical adenopathy. Skin:     General: Skin is warm and dry. Test ordered:    []COVID    _________  []Strep    ___________  []Flu       _________    Diagnosis:  Seasonal allergies    []Flu  []Strep Throat  [x]Uncertain Viral Respiratory Illness  []Possible COVID-19  []Exposure [id number]-28  []Other    PLAN:  []Referred to emergency department/ respiratory dept. for evaluation      Discharge home with verbal and or written instructions for:  []Preventing the spread of Coronavirus   []Flu management  []Strep throat management  []Possible COVID-19 exposure with self-quarantine, isolation instructions and management of symptoms  []Follow-up with primary care physician or emergency department if worsens  []Evaluation per physician/nurse practitioner in clinic      1. Seasonal allergies  Continue with symptomatic management. Recommend increased water intake as well as saline irrigation, mucolytics, and antihistamines as needed. Advised to call back if no improvement over the next 4-7 days.

## 2020-06-25 ENCOUNTER — OFFICE VISIT (OUTPATIENT)
Dept: PRIMARY CARE CLINIC | Age: 55
End: 2020-06-25
Payer: COMMERCIAL

## 2020-06-25 PROCEDURE — 3017F COLORECTAL CA SCREEN DOC REV: CPT | Performed by: NURSE PRACTITIONER

## 2020-06-25 PROCEDURE — 99213 OFFICE O/P EST LOW 20 MIN: CPT | Performed by: NURSE PRACTITIONER

## 2020-06-25 PROCEDURE — 4004F PT TOBACCO SCREEN RCVD TLK: CPT | Performed by: NURSE PRACTITIONER

## 2020-06-25 PROCEDURE — G8420 CALC BMI NORM PARAMETERS: HCPCS | Performed by: NURSE PRACTITIONER

## 2020-06-25 PROCEDURE — G8428 CUR MEDS NOT DOCUMENT: HCPCS | Performed by: NURSE PRACTITIONER

## 2020-06-30 LAB
SARS-COV-2: NOT DETECTED
SOURCE: NORMAL

## 2024-09-09 ENCOUNTER — APPOINTMENT (OUTPATIENT)
Dept: GENERAL RADIOLOGY | Age: 59
End: 2024-09-09
Payer: COMMERCIAL

## 2024-09-09 ENCOUNTER — HOSPITAL ENCOUNTER (EMERGENCY)
Age: 59
Discharge: HOME | End: 2024-09-09
Attending: STUDENT IN AN ORGANIZED HEALTH CARE EDUCATION/TRAINING PROGRAM
Payer: COMMERCIAL

## 2024-09-09 VITALS
WEIGHT: 166.2 LBS | DIASTOLIC BLOOD PRESSURE: 81 MMHG | TEMPERATURE: 98.2 F | BODY MASS INDEX: 28.38 KG/M2 | HEIGHT: 64 IN | OXYGEN SATURATION: 98 % | RESPIRATION RATE: 16 BRPM | SYSTOLIC BLOOD PRESSURE: 139 MMHG | HEART RATE: 79 BPM

## 2024-09-09 DIAGNOSIS — G89.29 WRIST PAIN, CHRONIC, LEFT: Primary | ICD-10-CM

## 2024-09-09 DIAGNOSIS — M25.532 WRIST PAIN, CHRONIC, LEFT: Primary | ICD-10-CM

## 2024-09-09 PROCEDURE — 99283 EMERGENCY DEPT VISIT LOW MDM: CPT

## 2024-09-09 PROCEDURE — 73110 X-RAY EXAM OF WRIST: CPT

## 2024-09-09 ASSESSMENT — PAIN SCALES - GENERAL: PAINLEVEL_OUTOF10: 3

## 2024-09-09 ASSESSMENT — PAIN DESCRIPTION - LOCATION: LOCATION: WRIST

## 2024-09-09 ASSESSMENT — PAIN DESCRIPTION - DESCRIPTORS: DESCRIPTORS: DULL;ACHING

## 2024-09-09 ASSESSMENT — LIFESTYLE VARIABLES
HOW MANY STANDARD DRINKS CONTAINING ALCOHOL DO YOU HAVE ON A TYPICAL DAY: 1 OR 2
HOW OFTEN DO YOU HAVE A DRINK CONTAINING ALCOHOL: 2-4 TIMES A MONTH

## 2024-09-09 ASSESSMENT — PAIN DESCRIPTION - ORIENTATION: ORIENTATION: LEFT

## 2024-09-09 ASSESSMENT — PAIN - FUNCTIONAL ASSESSMENT: PAIN_FUNCTIONAL_ASSESSMENT: 0-10

## 2024-10-03 ENCOUNTER — OFFICE VISIT (OUTPATIENT)
Dept: ORTHOPEDIC SURGERY | Age: 59
End: 2024-10-03
Payer: COMMERCIAL

## 2024-10-03 VITALS — BODY MASS INDEX: 28.34 KG/M2 | HEIGHT: 64 IN | WEIGHT: 166 LBS

## 2024-10-03 DIAGNOSIS — M23.203 OLD COMPLEX TEAR OF MEDIAL MENISCUS OF RIGHT KNEE: ICD-10-CM

## 2024-10-03 DIAGNOSIS — M17.0 PRIMARY OSTEOARTHRITIS OF BOTH KNEES: Primary | ICD-10-CM

## 2024-10-03 DIAGNOSIS — S89.80XA OVERUSE INJURY OF LOWER LEG: ICD-10-CM

## 2024-10-03 PROCEDURE — 20610 DRAIN/INJ JOINT/BURSA W/O US: CPT | Performed by: PHYSICIAN ASSISTANT

## 2024-10-03 PROCEDURE — G8419 CALC BMI OUT NRM PARAM NOF/U: HCPCS | Performed by: PHYSICIAN ASSISTANT

## 2024-10-03 PROCEDURE — 99203 OFFICE O/P NEW LOW 30 MIN: CPT | Performed by: PHYSICIAN ASSISTANT

## 2024-10-03 PROCEDURE — G8428 CUR MEDS NOT DOCUMENT: HCPCS | Performed by: PHYSICIAN ASSISTANT

## 2024-10-03 PROCEDURE — G8484 FLU IMMUNIZE NO ADMIN: HCPCS | Performed by: PHYSICIAN ASSISTANT

## 2024-10-03 PROCEDURE — 3017F COLORECTAL CA SCREEN DOC REV: CPT | Performed by: PHYSICIAN ASSISTANT

## 2024-10-03 PROCEDURE — 4004F PT TOBACCO SCREEN RCVD TLK: CPT | Performed by: PHYSICIAN ASSISTANT

## 2024-10-03 RX ORDER — METHYLPREDNISOLONE ACETATE 40 MG/ML
40 INJECTION, SUSPENSION INTRA-ARTICULAR; INTRALESIONAL; INTRAMUSCULAR; SOFT TISSUE ONCE
Status: COMPLETED | OUTPATIENT
Start: 2024-10-03 | End: 2024-10-03

## 2024-10-03 RX ORDER — LIDOCAINE HYDROCHLORIDE 10 MG/ML
1 INJECTION, SOLUTION INFILTRATION; PERINEURAL ONCE
Status: COMPLETED | OUTPATIENT
Start: 2024-10-03 | End: 2024-10-03

## 2024-10-03 RX ADMIN — METHYLPREDNISOLONE ACETATE 40 MG: 40 INJECTION, SUSPENSION INTRA-ARTICULAR; INTRALESIONAL; INTRAMUSCULAR; SOFT TISSUE at 11:01

## 2024-10-03 RX ADMIN — LIDOCAINE HYDROCHLORIDE 1 ML: 10 INJECTION, SOLUTION INFILTRATION; PERINEURAL at 11:00

## 2024-10-10 NOTE — PROGRESS NOTES
Date:  10/3/2024    Name:  Dee Roche  Address:  [address] [place] [id number]    :  1965      Age:   59 y.o.        Chief Complaint    Knee Pain (OPSP Right knee )      History of Present Illness:  Dee Roche is a 59 y.o. female who presents for evaluation of right knee pain.  Patient has a past medical history of a right knee arthroscopy with partial medial lateral meniscectomy conducted by Dr. Jose Oliver MD on 2018.  The patient works as a  and is on her feet for 6 to 8 hours a day.  Her symptoms began on July 3, 2024.  She describes her bilateral knee pain as achy and sharp.  Currently it is rated 2/10 but she states it can get up to 10 out of 10 after a long workday.  Occasionally she describes pseudolocking and catching sensations.  She also notes mild hypersensitivity over the saphenous nerve distribution.  Conservative treatment has included: Rest, ice, activity modification and ibuprofen.              Past Medical History:   Diagnosis Date    Arthritis         Past Surgical History:   Procedure Laterality Date    ANKLE ARTHROSCOPY Right 2018    RIGHT KNEE ARTHROSCOPY MEDIAL MENISECTOMY,SYNOVECTOMY    AORTO-ILIAC BYPASS GRAFT      CERVIX REMOVAL      TOTAL NEPHRECTOMY Right        No family history on file.    Social History     Socioeconomic History    Marital status:      Spouse name: None    Number of children: None    Years of education: None    Highest education level: None   Tobacco Use    Smoking status: Every Day     Current packs/day: 0.50     Types: Cigarettes    Smokeless tobacco: Current   Substance and Sexual Activity    Alcohol use: Yes     Alcohol/week: 15.0 standard drinks of alcohol     Types: 15 Glasses of wine per week    Drug use: Yes     Frequency: 1.0 times per week     Types: Marijuana (Weed)     Social Determinants of Health     Food Insecurity: No Transportation Needs (2024)    Received from  [facility],  [facility],

## 2025-01-07 ENCOUNTER — TELEPHONE (OUTPATIENT)
Dept: ORTHOPEDIC SURGERY | Age: 60
End: 2025-01-07

## 2025-01-07 NOTE — TELEPHONE ENCOUNTER
Other PATIENT WANTS TO KNOW AFTER SHE RECEIVED CORTISONE INJECTION CAN SHE GET A MEDICAL MASSAGE. PLEASE CALL TO ADVISE [phone]

## 2025-01-10 ENCOUNTER — OFFICE VISIT (OUTPATIENT)
Dept: ORTHOPEDIC SURGERY | Age: 60
End: 2025-01-10

## 2025-01-10 VITALS — WEIGHT: 166 LBS | BODY MASS INDEX: 28.34 KG/M2 | HEIGHT: 64 IN

## 2025-01-10 DIAGNOSIS — M17.0 PRIMARY OSTEOARTHRITIS OF BOTH KNEES: Primary | ICD-10-CM

## 2025-01-10 DIAGNOSIS — M23.203 OLD COMPLEX TEAR OF MEDIAL MENISCUS OF RIGHT KNEE: ICD-10-CM

## 2025-01-10 RX ORDER — TRIAMCINOLONE ACETONIDE 40 MG/ML
40 INJECTION, SUSPENSION INTRA-ARTICULAR; INTRAMUSCULAR ONCE
Status: COMPLETED | OUTPATIENT
Start: 2025-01-10 | End: 2025-01-10

## 2025-01-10 RX ORDER — LIDOCAINE HYDROCHLORIDE 10 MG/ML
4 INJECTION, SOLUTION INFILTRATION; PERINEURAL ONCE
Status: COMPLETED | OUTPATIENT
Start: 2025-01-10 | End: 2025-01-10

## 2025-01-10 RX ADMIN — TRIAMCINOLONE ACETONIDE 40 MG: 40 INJECTION, SUSPENSION INTRA-ARTICULAR; INTRAMUSCULAR at 12:05

## 2025-01-10 RX ADMIN — LIDOCAINE HYDROCHLORIDE 4 ML: 10 INJECTION, SOLUTION INFILTRATION; PERINEURAL at 12:05

## 2025-01-10 NOTE — PROGRESS NOTES
Chief Complaint    Knee Pain (Right knee - req cortisone inj)      History of Present Illness:  Dee Roche is a 59 y.o. female who presents for follow-up on her right knee pain.  This patient is known to [facility] and is being treated conservatively for their arthritis. Patient has a past medical history of a right knee arthroscopy with partial medial lateral meniscectomy conducted by Dr. Jose Oliver MD on 04/25/2018. The patient works as a  and is on her feet for 6 to 8 hours a day. This conservative treatment includes: Rest, ice, activity modification, home exercises, stretches, and over-the-counter pain medication.  The patient denies any new injury. The patient denies any numbness, paresthesias, or weakness.          Physical exam:  Inspection: Both knees without erythema, ecchymosis, discoloration, abrasion, contusions, deformity or signs of infection.  Palpation:  There is tenderness to palpation to the joint line of both knees. There is patellofemoral crepitus palpable in both knees.  No tenderness to palpation to any other osseous or soft tissue structures of the knee.  Range of motion: Grossly intact  Neurovascular: Patient is neurovascularly intact, equally bilaterally.  2+ dorsal pedal pulses.      Procedures:  After informed consent was provided, the patient was seated on the exam table with the right knee flexed to 90 degrees. The anterolateral aspect of the knee adjacent to the joint line was prepped with Chlora-prep. The skin and subcutaneous tissues were anesthetized with ethyl chloride spray. A 22-gauge needle was inserted into the right knee and 2 ml of 40 mg/ml Kenalog was injected. The needle was withdrawn and the puncture site sealed with a Band-Aid. The patient tolerated the procedure well. Post injection instructions and precautions given and any problems to notify us.  (Conducted by Aftab Mon PA-C)         Assessment:  Encounter Diagnosis   Name Primary?    Primary 
- c/w statin

## 2025-06-11 ENCOUNTER — OFFICE VISIT (OUTPATIENT)
Dept: ORTHOPEDIC SURGERY | Age: 60
End: 2025-06-11

## 2025-06-11 VITALS — HEIGHT: 64 IN | BODY MASS INDEX: 28.34 KG/M2 | WEIGHT: 166 LBS

## 2025-06-11 DIAGNOSIS — R52 PAIN: Primary | ICD-10-CM

## 2025-06-11 DIAGNOSIS — M17.11 PRIMARY OSTEOARTHRITIS OF RIGHT KNEE: ICD-10-CM

## 2025-06-11 RX ORDER — LIDOCAINE HYDROCHLORIDE 10 MG/ML
4 INJECTION, SOLUTION INFILTRATION; PERINEURAL ONCE
Status: COMPLETED | OUTPATIENT
Start: 2025-06-11 | End: 2025-06-11

## 2025-06-11 RX ORDER — BUPIVACAINE HYDROCHLORIDE 2.5 MG/ML
4 INJECTION, SOLUTION INFILTRATION; PERINEURAL ONCE
Status: COMPLETED | OUTPATIENT
Start: 2025-06-11 | End: 2025-06-11

## 2025-06-11 RX ORDER — TRIAMCINOLONE ACETONIDE 40 MG/ML
80 INJECTION, SUSPENSION INTRA-ARTICULAR; INTRAMUSCULAR ONCE
Status: COMPLETED | OUTPATIENT
Start: 2025-06-11 | End: 2025-06-11

## 2025-06-11 RX ADMIN — BUPIVACAINE HYDROCHLORIDE 10 MG: 2.5 INJECTION, SOLUTION INFILTRATION; PERINEURAL at 12:05

## 2025-06-11 RX ADMIN — LIDOCAINE HYDROCHLORIDE 4 ML: 10 INJECTION, SOLUTION INFILTRATION; PERINEURAL at 12:06

## 2025-06-11 RX ADMIN — TRIAMCINOLONE ACETONIDE 80 MG: 40 INJECTION, SUSPENSION INTRA-ARTICULAR; INTRAMUSCULAR at 12:06

## 2025-06-11 NOTE — PROGRESS NOTES
give her a cortisone injection.  We did discuss total knee arthroplasty.  Patient will need to be nicotine free for 6 weeks before and 6 weeks after before procedure.    I discussed with Dee A eMlchor that her history, symptoms, signs, and imaging are most consistent with knee arthritis.    We reviewed the natural history of these conditions and treatment options ranging from conservative measures (rest, icing, activity modification, physical therapy, pain meds) to surgical options.     In terms of treatment, I recommended continuing with rest, icing, avoidance of painful activities, NSAIDs or pain meds as tolerated, and physical therapy.     We discussed surgical options as well, should conservative measures fail.     Electronically signed by Yasmany Cordova PA-C on 6/11/2025 at 11:46 AM  This dictation was generated by voice recognition computer software.  Although all attempts are made to edit the dictation for accuracy, there may be errors in the transcription that are not intended.